# Patient Record
Sex: MALE | Race: WHITE | NOT HISPANIC OR LATINO | Employment: UNEMPLOYED | URBAN - METROPOLITAN AREA
[De-identification: names, ages, dates, MRNs, and addresses within clinical notes are randomized per-mention and may not be internally consistent; named-entity substitution may affect disease eponyms.]

---

## 2017-12-14 ENCOUNTER — GENERIC CONVERSION - ENCOUNTER (OUTPATIENT)
Dept: OTHER | Facility: OTHER | Age: 32
End: 2017-12-14

## 2017-12-18 ENCOUNTER — ANESTHESIA EVENT (OUTPATIENT)
Dept: PERIOP | Facility: HOSPITAL | Age: 32
End: 2017-12-18
Payer: OTHER GOVERNMENT

## 2017-12-18 RX ORDER — DRONABINOL 5 MG/1
5 CAPSULE ORAL 4 TIMES DAILY
COMMUNITY

## 2017-12-18 RX ORDER — MODAFINIL 200 MG/1
200 TABLET ORAL DAILY
COMMUNITY

## 2017-12-18 RX ORDER — GABAPENTIN 600 MG/1
300 TABLET ORAL 3 TIMES DAILY
COMMUNITY

## 2017-12-19 ENCOUNTER — GENERIC CONVERSION - ENCOUNTER (OUTPATIENT)
Dept: GASTROENTEROLOGY | Facility: CLINIC | Age: 32
End: 2017-12-19

## 2017-12-19 ENCOUNTER — ANESTHESIA (OUTPATIENT)
Dept: PERIOP | Facility: HOSPITAL | Age: 32
End: 2017-12-19
Payer: OTHER GOVERNMENT

## 2017-12-19 ENCOUNTER — HOSPITAL ENCOUNTER (OUTPATIENT)
Facility: HOSPITAL | Age: 32
Setting detail: OUTPATIENT SURGERY
Discharge: HOME | End: 2017-12-19
Attending: INTERNAL MEDICINE | Admitting: INTERNAL MEDICINE
Payer: OTHER GOVERNMENT

## 2017-12-19 VITALS
HEART RATE: 60 BPM | OXYGEN SATURATION: 100 % | WEIGHT: 213 LBS | HEIGHT: 76 IN | TEMPERATURE: 98 F | RESPIRATION RATE: 14 BRPM | DIASTOLIC BLOOD PRESSURE: 55 MMHG | BODY MASS INDEX: 25.94 KG/M2 | SYSTOLIC BLOOD PRESSURE: 95 MMHG

## 2017-12-19 DIAGNOSIS — R63.4 RECENT WEIGHT LOSS: ICD-10-CM

## 2017-12-19 DIAGNOSIS — R11.2 NAUSEA AND VOMITING: ICD-10-CM

## 2017-12-19 DIAGNOSIS — K21.9 CHRONIC GERD: ICD-10-CM

## 2017-12-19 DIAGNOSIS — R01.1 HEART MURMUR: ICD-10-CM

## 2017-12-19 PROCEDURE — 88342 IMHCHEM/IMCYTCHM 1ST ANTB: CPT | Performed by: INTERNAL MEDICINE

## 2017-12-19 PROCEDURE — 88341 IMHCHEM/IMCYTCHM EA ADD ANTB: CPT | Performed by: INTERNAL MEDICINE

## 2017-12-19 PROCEDURE — 88305 TISSUE EXAM BY PATHOLOGIST: CPT | Performed by: INTERNAL MEDICINE

## 2017-12-19 RX ORDER — SODIUM CHLORIDE, SODIUM LACTATE, POTASSIUM CHLORIDE, CALCIUM CHLORIDE 600; 310; 30; 20 MG/100ML; MG/100ML; MG/100ML; MG/100ML
125 INJECTION, SOLUTION INTRAVENOUS CONTINUOUS
Status: DISCONTINUED | OUTPATIENT
Start: 2017-12-19 | End: 2017-12-19 | Stop reason: HOSPADM

## 2017-12-19 RX ORDER — PROPOFOL 10 MG/ML
INJECTION, EMULSION INTRAVENOUS AS NEEDED
Status: DISCONTINUED | OUTPATIENT
Start: 2017-12-19 | End: 2017-12-19 | Stop reason: SURG

## 2017-12-19 RX ADMIN — SODIUM CHLORIDE, POTASSIUM CHLORIDE, SODIUM LACTATE AND CALCIUM CHLORIDE: 600; 310; 30; 20 INJECTION, SOLUTION INTRAVENOUS at 10:38

## 2017-12-19 RX ADMIN — PROPOFOL 50 MG: 10 INJECTION, EMULSION INTRAVENOUS at 11:07

## 2017-12-19 RX ADMIN — SODIUM CHLORIDE, POTASSIUM CHLORIDE, SODIUM LACTATE AND CALCIUM CHLORIDE 125 ML/HR: 600; 310; 30; 20 INJECTION, SOLUTION INTRAVENOUS at 09:45

## 2017-12-19 RX ADMIN — PROPOFOL 150 MG: 10 INJECTION, EMULSION INTRAVENOUS at 11:05

## 2017-12-19 NOTE — OP NOTE
**** GI/ENDOSCOPY REPORT ****     PATIENT NAME: Rosie De La Cruz  GUESS - VISIT ID:  Patient ID: [id number]   YOB: 1985     INTRODUCTION: Esophagogastroduodenoscopy - A 33 male patient presents for   an outpatient Esophagogastroduodenoscopy at [facility]  INDICATIONS: GERD  Nausea  Vomiting Loss of weight  CONSENT: The benefits, risks, and alternatives to the procedure were   discussed and informed consent was obtained from the patient  PREPARATION:  EKG, pulse, pulse oximetry and blood pressure were monitored   throughout the procedure  MEDICATIONS:asa 2     PROCEDURE:  The endoscope was passed without difficulty through the mouth   under direct visualization and advanced to the 3rd portion of the   duodenum  The scope was withdrawn and the mucosa was carefully examined  FINDINGS:   Esophagus: LA Class A reflux-induced esophagitis was found in   the GE junction, 40 cm from the entry site  The Z line was visualized at   40 cm from the entry site  Stomach: The antrum, body of the stomach,   cardia, fundus, incisura, and pylorus appeared to be normal  A biopsy was   taken from the antrum, body of the stomach, and fundus  Duodenum: The   duodenal bulb, 2nd portion of the duodenum, and 3rd portion of the   duodenum appeared to be normal      COMPLICATIONS: There were no complications  IMPRESSIONS: Esophagitis seen in the GE junction  Z line visualized  Normal antrum, body of the stomach, cardia, fundus, incisura, and pylorus  Biopsy taken  Normal duodenal bulb, 2nd portion of the duodenum, and 3rd   portion of the duodenum  RECOMMENDATIONS: Continue current medications  Follow-up on the results of   the biopsy specimens in 2 weeks  ESTIMATED BLOOD LOSS: Insignificant  PATHOLOGY SPECIMENS: Random biopsy taken from the antrum, body of the   stomach, and fundus       PROCEDURE CODES: 04663 - EGD flexible; with biopsy     ICD-9 Codes: 530 81 Esophageal reflux 787 01 Nausea with vomiting 783 21   Loss of weight 530 11 Reflux esophagitis     ICD-10 Codes: K21 Gastro-esophageal reflux disease R11 2 Nausea with   vomiting, unspecified R63 4 Abnormal weight loss K20 9 Esophagitis,   unspecified     PERFORMED BY: Dr Katie Castleman, M D  [facility]  on 12/19/2017  Version 1, electronically signed by DOMINIC Rowe , D O  on   12/19/2017 at 11:12

## 2017-12-19 NOTE — ANESTHESIA PREPROCEDURE EVALUATION
Review of Systems/Medical History  Patient summary reviewed        Cardiovascular  Negative cardio ROS    Pulmonary  Negative pulmonary ROS ,        GI/Hepatic    GERD ,        Negative  ROS        Endo/Other  Negative endo/other ROS      GYN  Negative gynecology ROS          Hematology  Negative hematology ROS      Musculoskeletal  Negative musculoskeletal ROS        Neurology  Seizures ,    Comment: ptsd   Psychology   Negative psychology ROS            Physical Exam    Airway    Mallampati score: II  TM Distance: >3 FB  Neck ROM: full     Dental       Cardiovascular  Comment: Negative ROS, Cardiovascular exam normal    Pulmonary  Pulmonary exam normal     Other Findings        Anesthesia Plan  ASA Score- 2       Anesthesia Type- IV sedation with anesthesia with ASA Monitors  Additional Monitors:   Airway Plan:           Induction- intravenous  Informed Consent- Anesthetic plan and risks discussed with patient  I personally reviewed this patient with the CRNA  Discussed and agreed on the Anesthesia Plan with the CRNA  Val Kendrick

## 2017-12-19 NOTE — ANESTHESIA POSTPROCEDURE EVALUATION
Post-Op Assessment Note      CV Status:  Stable    Mental Status:  Awake    Hydration Status:  Stable    PONV Controlled:  None    Airway Patency:  Patent and adequate    Post Op Vitals Reviewed: Yes          Staff: CRNA, Anesthesiologist           BP   121/74   Temp      Pulse  65   Resp   18   SpO2   100%

## 2017-12-19 NOTE — DISCHARGE INSTRUCTIONS
Upper Endoscopy   WHAT YOU NEED TO KNOW:   An upper endoscopy is also called an upper gastrointestinal (GI) endoscopy, or an esophagogastroduodenoscopy (EGD)  You may feel bloated, gassy, or have some abdominal discomfort after your procedure  Your throat may be sore for 24 to 36 hours  You may burp or pass gas from air that is still inside your body  DISCHARGE INSTRUCTIONS:   Call 911 if:   · You have sudden chest pain or trouble breathing  Seek care immediately if:   · You feel dizzy or faint  · You have trouble swallowing  · You have severe throat pain  · Your bowel movements are very dark or black  · Your abdomen is hard and firm and you have severe pain  · You vomit blood  Contact your healthcare provider if:   · You feel full or bloated and cannot burp or pass gas  · You have not had a bowel movement for 3 days after your procedure  · You have neck pain  · You have a fever or chills  · You have nausea or are vomiting  · You have a rash or hives  · You have questions or concerns about your endoscopy  Relieve a sore throat:  Suck on throat lozenges or crushed ice  Gargle with a small amount of warm salt water  Mix 1 teaspoon of salt and 1 cup of warm water to make salt water  Relieve gas and discomfort from bloating:  Lie on your right side with a heating pad on your abdomen  Take short walks to help pass gas  Eat small meals until bloating is relieved  Rest after your procedure:  Do not drive or make important decisions until the day after your procedure  Return to your normal activity as directed  You can usually return to work the day after your procedure  Follow up with your healthcare provider as directed:  Write down your questions so you remember to ask them during your visits  © 2017 [employer]  Information is for End User's use only and may not be sold, redistributed or otherwise used for commercial purposes   All illustrations and images included in [employer] are the copyrighted property of [employer]  or [address]  The above information is an  only  It is not intended as medical advice for individual conditions or treatments  Talk to your doctor, nurse or pharmacist before following any medical regimen to see if it is safe and effective for you

## 2018-01-02 ENCOUNTER — GENERIC CONVERSION - ENCOUNTER (OUTPATIENT)
Dept: OTHER | Facility: OTHER | Age: 33
End: 2018-01-02

## 2018-01-02 ENCOUNTER — ALLSCRIPTS OFFICE VISIT (OUTPATIENT)
Dept: OTHER | Facility: OTHER | Age: 33
End: 2018-01-02

## 2018-01-02 DIAGNOSIS — R01.1 CARDIAC MURMUR: ICD-10-CM

## 2018-01-24 ENCOUNTER — TELEPHONE (OUTPATIENT)
Dept: GASTROENTEROLOGY | Facility: CLINIC | Age: 33
End: 2018-01-24

## 2018-01-24 VITALS
HEIGHT: 76 IN | SYSTOLIC BLOOD PRESSURE: 112 MMHG | BODY MASS INDEX: 26.55 KG/M2 | OXYGEN SATURATION: 98 % | DIASTOLIC BLOOD PRESSURE: 70 MMHG | HEART RATE: 74 BPM | WEIGHT: 218 LBS

## 2018-01-24 VITALS
BODY MASS INDEX: 25.63 KG/M2 | HEIGHT: 76 IN | WEIGHT: 210.5 LBS | SYSTOLIC BLOOD PRESSURE: 124 MMHG | DIASTOLIC BLOOD PRESSURE: 80 MMHG | HEART RATE: 76 BPM

## 2018-01-29 ENCOUNTER — HOSPITAL ENCOUNTER (OUTPATIENT)
Dept: NON INVASIVE DIAGNOSTICS | Facility: CLINIC | Age: 33
Discharge: HOME | End: 2018-01-29
Payer: OTHER GOVERNMENT

## 2018-01-29 DIAGNOSIS — R01.1 CARDIAC MURMUR: ICD-10-CM

## 2018-01-29 PROCEDURE — 93306 TTE W/DOPPLER COMPLETE: CPT

## 2018-01-29 PROCEDURE — 93306 TTE W/DOPPLER COMPLETE: CPT | Performed by: INTERNAL MEDICINE

## 2021-02-08 ENCOUNTER — APPOINTMENT (OUTPATIENT)
Dept: URGENT CARE | Facility: CLINIC | Age: 36
End: 2021-02-08

## 2021-02-08 DIAGNOSIS — Z02.89 ENCOUNTER FOR PHYSICAL EXAMINATION RELATED TO EMPLOYMENT: Primary | ICD-10-CM

## 2021-02-08 PROCEDURE — 86735 MUMPS ANTIBODY: CPT | Performed by: PHYSICIAN ASSISTANT

## 2021-02-08 PROCEDURE — 86787 VARICELLA-ZOSTER ANTIBODY: CPT | Performed by: PHYSICIAN ASSISTANT

## 2021-02-08 PROCEDURE — 86480 TB TEST CELL IMMUN MEASURE: CPT | Performed by: PHYSICIAN ASSISTANT

## 2021-02-08 PROCEDURE — 86765 RUBEOLA ANTIBODY: CPT | Performed by: PHYSICIAN ASSISTANT

## 2021-02-08 PROCEDURE — 86762 RUBELLA ANTIBODY: CPT | Performed by: PHYSICIAN ASSISTANT

## 2021-02-09 LAB
MEV IGG SER QL: ABNORMAL
MUV IGG SER QL: NORMAL
RUBV IGG SERPL IA-ACNC: >175 IU/ML
VZV IGG SER IA-ACNC: NORMAL

## 2021-02-10 LAB
GAMMA INTERFERON BACKGROUND BLD IA-ACNC: 0.01 IU/ML
M TB IFN-G BLD-IMP: NEGATIVE
M TB IFN-G CD4+ BCKGRND COR BLD-ACNC: 0.01 IU/ML
M TB IFN-G CD4+ BCKGRND COR BLD-ACNC: 0.01 IU/ML
MITOGEN IGNF BCKGRD COR BLD-ACNC: >10 IU/ML

## 2023-11-30 ENCOUNTER — OFFICE VISIT (OUTPATIENT)
Dept: URGENT CARE | Facility: CLINIC | Age: 38
End: 2023-11-30
Payer: COMMERCIAL

## 2023-11-30 VITALS
HEART RATE: 78 BPM | RESPIRATION RATE: 20 BRPM | DIASTOLIC BLOOD PRESSURE: 68 MMHG | BODY MASS INDEX: 24.6 KG/M2 | WEIGHT: 202 LBS | TEMPERATURE: 98 F | SYSTOLIC BLOOD PRESSURE: 130 MMHG | HEIGHT: 76 IN | OXYGEN SATURATION: 100 %

## 2023-11-30 DIAGNOSIS — Z72.89 ENGAGES IN VAPING: ICD-10-CM

## 2023-11-30 DIAGNOSIS — J22 ACUTE RESPIRATORY INFECTION: Primary | ICD-10-CM

## 2023-11-30 PROCEDURE — 99214 OFFICE O/P EST MOD 30 MIN: CPT | Performed by: NURSE PRACTITIONER

## 2023-11-30 RX ORDER — AZITHROMYCIN 250 MG/1
TABLET, FILM COATED ORAL
Qty: 6 TABLET | Refills: 0 | Status: SHIPPED | OUTPATIENT
Start: 2023-11-30 | End: 2023-12-04

## 2023-11-30 RX ORDER — BENZONATATE 100 MG/1
100 CAPSULE ORAL 3 TIMES DAILY PRN
Qty: 20 CAPSULE | Refills: 0 | Status: SHIPPED | OUTPATIENT
Start: 2023-11-30

## 2023-11-30 NOTE — PROGRESS NOTES
[facility]        NAME: Kenneth Campbell is a 41 y.o. male  : 1985    MRN: [id number]  DATE: 2023  TIME: 10:38 AM    Assessment and Plan   Acute respiratory infection [J22]  1. Acute respiratory infection  azithromycin (ZITHROMAX) 250 mg tablet    benzonatate (TESSALON PERLES) 100 mg capsule      2. Engages in vaping  azithromycin (ZITHROMAX) 250 mg tablet    benzonatate (TESSALON PERLES) 100 mg capsule            Patient Instructions       Follow up with PCP in 3-5 days. Proceed to  ER if symptoms worsen. You have a respiratory infection. You are to take the azithromycin and tessalon pearls for cough. Drink plenty of water. Follow up with your provider in 3-5 days   Go to the ED if symptoms worsen  You may continue the OTC cough, cold congestion products if needed       Chief Complaint     Chief Complaint   Patient presents with    Cough     X one week , prod for lime green     Nasal Congestion     X one week , liome green color         History of Present Illness       This is a 41year old male who states has had cough, congestion, sorethroat x 1 week. Nasal congestion and productive cough with lime-green sputum per patient. He has had some diarrhea. He denies covid or flu vaccine and denies any testing. He states that he has been taking OTC w/o much relief. He missed work today. Denies fevers, n/v.           Review of Systems   Review of Systems   Constitutional:  Positive for chills. HENT:  Positive for congestion and sore throat. Eyes: Negative. Respiratory:  Positive for cough. Cardiovascular: Negative. Gastrointestinal:  Positive for diarrhea. Endocrine: Negative. Genitourinary: Negative. Musculoskeletal: Negative. Skin: Negative. Allergic/Immunologic: Negative. Neurological: Negative. Hematological: Negative. Psychiatric/Behavioral: Negative.            Current Medications       Current Outpatient Medications:     azithromycin (ZITHROMAX) 250 mg tablet, Take 2 tablets today then 1 tablet daily x 4 days, Disp: 6 tablet, Rfl: 0    benzonatate (TESSALON PERLES) 100 mg capsule, Take 1 capsule (100 mg total) by mouth 3 (three) times a day as needed for cough, Disp: 20 capsule, Rfl: 0    dronabinol (MARINOL) 5 MG capsule, Take 5 mg by mouth 4 (four) times a day (Patient not taking: Reported on 11/30/2023), Disp: , Rfl:     gabapentin (NEURONTIN) 600 MG tablet, Take 300 mg by mouth 3 (three) times a day (Patient not taking: Reported on 11/30/2023), Disp: , Rfl:     modafinil (PROVIGIL) 200 MG tablet, Take 200 mg by mouth daily (Patient not taking: Reported on 11/30/2023), Disp: , Rfl:     Current Allergies     Allergies as of 11/30/2023    (No Known Allergies)            The following portions of the patient's history were reviewed and updated as appropriate: allergies, current medications, past family history, past medical history, past social history, past surgical history and problem list.     Past Medical History:   Diagnosis Date    GERD (gastroesophageal reflux disease)     Heart murmur     PTSD (post-traumatic stress disorder)     Seizures ([address])     july 2016 r/t wellbutrin       Past Surgical History:   Procedure Laterality Date    HAND SURGERY      x 9    [place] ESOPHAGOGASTRODUODENOSCOPY TRANSORAL DIAGNOSTIC N/A 12/19/2017    Procedure: ESOPHAGOGASTRODUODENOSCOPY (EGD); Surgeon: Janes Ward MD;  Location: [facility]; Service: Gastroenterology       History reviewed. No pertinent family history. Medications have been verified. Objective   /68   Pulse 78   Temp 98 °F (36.7 °C)   Resp 20   Ht 6' 4" (1.93 m)   Wt 91.6 kg (202 lb)   SpO2 100%   BMI 24.59 kg/m²   No LMP for male patient. Physical Exam     Physical Exam  Vitals and nursing note reviewed. Constitutional:       General: He is not in acute distress. Appearance: Normal appearance. He is normal weight.  He is not ill-appearing, toxic-appearing or diaphoretic. HENT:      Head: Normocephalic and atraumatic. Right Ear: Tympanic membrane and ear canal normal.      Left Ear: Tympanic membrane and ear canal normal.      Nose: Congestion present. No rhinorrhea. Mouth/Throat:      Mouth: Mucous membranes are moist.      Pharynx: Oropharynx is clear. Posterior oropharyngeal erythema present. No oropharyngeal exudate. Comments: Injected   Eyes:      Extraocular Movements: Extraocular movements intact. Cardiovascular:      Rate and Rhythm: Normal rate and regular rhythm. Pulses: Normal pulses. Heart sounds: Normal heart sounds. Pulmonary:      Effort: Pulmonary effort is normal. No respiratory distress. Breath sounds: Normal breath sounds. No stridor. No wheezing, rhonchi or rales. Comments: Constant dry upper airway cough   Chest:      Chest wall: No tenderness. Musculoskeletal:         General: Normal range of motion. Cervical back: Normal range of motion and neck supple. Skin:     General: Skin is warm and dry. Capillary Refill: Capillary refill takes less than 2 seconds. Neurological:      General: No focal deficit present. Mental Status: He is alert and oriented to person, place, and time. Psychiatric:         Mood and Affect: Mood normal.         Behavior: Behavior normal.         Thought Content:  Thought content normal.         Judgment: Judgment normal.

## 2023-11-30 NOTE — LETTER
3November 30, 2023     Patient: Angela Phan   YOB: 1985   Date of Visit: 11/30/2023       To Whom It May Concern: It is my medical opinion that Angela Phan may return to work on 12/4/2023 . If you have any questions or concerns, please don't hesitate to call.          Sincerely,        TIN Love    CC: No Recipients

## 2023-11-30 NOTE — PATIENT INSTRUCTIONS
You have a respiratory infection. You are to take the azithromycin and tessalon pearls for cough. Drink plenty of water.   Follow up with your provider in 3-5 days   Go to the ED if symptoms worsen  You may continue the OTC cough, cold congestion products if needed

## 2024-09-30 ENCOUNTER — OFFICE VISIT (OUTPATIENT)
Dept: URGENT CARE | Facility: CLINIC | Age: 39
End: 2024-09-30
Payer: COMMERCIAL

## 2024-09-30 VITALS
SYSTOLIC BLOOD PRESSURE: 129 MMHG | TEMPERATURE: 98 F | HEART RATE: 60 BPM | RESPIRATION RATE: 18 BRPM | DIASTOLIC BLOOD PRESSURE: 76 MMHG | OXYGEN SATURATION: 97 %

## 2024-09-30 DIAGNOSIS — L72.9 CYST OF SKIN: Primary | ICD-10-CM

## 2024-09-30 PROCEDURE — 10060 I&D ABSCESS SIMPLE/SINGLE: CPT | Performed by: STUDENT IN AN ORGANIZED HEALTH CARE EDUCATION/TRAINING PROGRAM

## 2024-09-30 PROCEDURE — 99214 OFFICE O/P EST MOD 30 MIN: CPT | Performed by: STUDENT IN AN ORGANIZED HEALTH CARE EDUCATION/TRAINING PROGRAM

## 2024-09-30 NOTE — PROGRESS NOTES
[facility]        NAME: Rosa Duncan is a 39 y.o. male  : 1985    MRN: [id number]  DATE: 2024  TIME: 4:00 PM    Assessment and Orders   Cyst of skin [L72.9]  1. Cyst of skin  Incision and drain            Plan and Discussion      Symptoms and exam consistent with cyst. Easily removed with I&D. Does not require antibiotics. Discussed wound care.      Risks and benefits discussed. Patient understands and agrees with the plan.     PATIENT INSTRUCTIONS    If tests have been performed at [facility], our office will contact you with results if changes need to be made to the care plan discussed with you at the visit.  [facility]    Follow up with PCP.     If any of the following occur, please report to your nearest ED for evaluation or call 911.   Difficultly breathing or shortness of breath  Chest pain  Acutely worsening symptoms.         Chief Complaint     Chief Complaint   Patient presents with    Mass     Left elbow           History of Present Illness       Cyst developed on left forearm over the weekend. No injury to the area. No pain in the elbow.     Rash  This is a new problem. The current episode started in the past 7 days. The problem has been gradually worsening since onset. The affected locations include the left arm. The rash is characterized by swelling. Pertinent negatives include no fever or itching.       Review of Systems   Review of Systems   Constitutional:  Negative for fever.   Skin:  Positive for rash. Negative for itching.         Current Medications       Current Outpatient Medications:     benzonatate (TESSALON PERLES) 100 mg capsule, Take 1 capsule (100 mg total) by mouth 3 (three) times a day as needed for cough (Patient not taking: Reported on 2024), Disp: 20 capsule, Rfl: 0    dronabinol (MARINOL) 5 MG capsule, Take 5 mg by mouth 4 (four) times a day (Patient not taking: Reported on 2023), Disp: , Rfl:      gabapentin (NEURONTIN) 600 MG tablet, Take 300 mg by mouth 3 (three) times a day (Patient not taking: Reported on 11/30/2023), Disp: , Rfl:     modafinil (PROVIGIL) 200 MG tablet, Take 200 mg by mouth daily (Patient not taking: Reported on 11/30/2023), Disp: , Rfl:     Current Allergies     Allergies as of 09/30/2024    (No Known Allergies)            The following portions of the patient's history were reviewed and updated as appropriate: allergies, current medications, past family history, past medical history, past social history, past surgical history and problem list.     Past Medical History:   Diagnosis Date    GERD (gastroesophageal reflux disease)     Heart murmur     PTSD (post-traumatic stress disorder)     Seizures (HCC)     july 2016 r/t wellbutrin       Past Surgical History:   Procedure Laterality Date    HAND SURGERY      x 9    [place] ESOPHAGOGASTRODUODENOSCOPY TRANSORAL DIAGNOSTIC N/A 12/19/2017    Procedure: ESOPHAGOGASTRODUODENOSCOPY (EGD);  Surgeon: Bony Nava MD;  Location: [facility];  Service: Gastroenterology       History reviewed. No pertinent family history.      Medications have been verified.        Objective   /76   Pulse 60   Temp 98 °F (36.7 °C)   Resp 18   SpO2 97%   No LMP for male patient.       Physical Exam     Physical Exam  Constitutional:       General: He is not in acute distress.  Pulmonary:      Effort: Pulmonary effort is normal. No respiratory distress.   Skin:     Findings: Lesion present. No erythema or rash.          Neurological:      General: No focal deficit present.      Mental Status: He is alert and oriented to person, place, and time.   Psychiatric:         Mood and Affect: Mood normal.         Behavior: Behavior normal.               Nelly Sanders DO     Incision and drain    Date/Time: 9/30/2024 2:30 PM    Performed by: Nelly Sanders DO  Authorized by: Nelly Sanders DO  Universal Protocol:  procedure performed by consultantConsent:  Verbal consent obtained.  Consent given by: patient  Patient understanding: patient states understanding of the procedure being performed  Patient consent: the patient's understanding of the procedure matches consent given  Patient identity confirmed: verbally with patient    Patient location:  Clinic  Location:     Type:  Cyst    Size:  2 cm    Location:  Upper extremity    Upper extremity location:  L arm  Pre-procedure details:     Skin preparation:  Betadine  Anesthesia (see MAR for exact dosages):     Anesthesia method:  Local infiltration    Local anesthetic:  Lidocaine 1% w/o epi  Procedure details:     Complexity:  Simple    Needle aspiration: no      Incision types:  Stab incision    Scalpel blade:  11    Approach:  Open    Incision depth:  Subcutaneous    Wound management:  Probed and deloculated    Drainage:  Purulent    Drainage amount:  Moderate    Wound treatment:  Wound left open  Post-procedure details:     Patient tolerance of procedure:  Tolerated well, no immediate complications

## 2025-05-09 ENCOUNTER — OFFICE VISIT (OUTPATIENT)
Dept: URGENT CARE | Facility: CLINIC | Age: 40
End: 2025-05-09
Payer: COMMERCIAL

## 2025-05-09 VITALS
TEMPERATURE: 98 F | DIASTOLIC BLOOD PRESSURE: 70 MMHG | HEIGHT: 76 IN | SYSTOLIC BLOOD PRESSURE: 116 MMHG | RESPIRATION RATE: 18 BRPM | BODY MASS INDEX: 24.96 KG/M2 | HEART RATE: 78 BPM | WEIGHT: 205 LBS | OXYGEN SATURATION: 99 %

## 2025-05-09 DIAGNOSIS — H57.89 IRRITATION OF EYE: Primary | ICD-10-CM

## 2025-05-09 PROCEDURE — 99214 OFFICE O/P EST MOD 30 MIN: CPT | Performed by: PHYSICAL MEDICINE & REHABILITATION

## 2025-05-09 PROCEDURE — 65205 REMOVE FOREIGN BODY FROM EYE: CPT | Performed by: PHYSICAL MEDICINE & REHABILITATION

## 2025-05-09 RX ORDER — TETRACAINE HYDROCHLORIDE 5 MG/ML
2 SOLUTION OPHTHALMIC ONCE
Status: COMPLETED | OUTPATIENT
Start: 2025-05-09 | End: 2025-05-09

## 2025-05-09 RX ORDER — POLYMYXIN B SULFATE AND TRIMETHOPRIM 1; 10000 MG/ML; [USP'U]/ML
1 SOLUTION OPHTHALMIC EVERY 6 HOURS
Qty: 10 ML | Refills: 0 | Status: SHIPPED | OUTPATIENT
Start: 2025-05-09

## 2025-05-09 RX ADMIN — TETRACAINE HYDROCHLORIDE 2 DROP: 5 SOLUTION OPHTHALMIC at 13:00

## 2025-05-09 NOTE — PROGRESS NOTES
[facility]        NAME: Rosa Duncan is a 40 y.o. male  : 1985    MRN: [id number]  DATE: May 9, 2025  TIME: 1:28 PM    Assessment and Plan   Irritation of eye [H57.89]  1. Irritation of eye  polymyxin b-trimethoprim (POLYTRIM) ophthalmic solution    Foreign body removal    tetracaine 0.5 % ophthalmic solution 2 drop    fluorescein sodium sterile 1 mg ophthalmic strip 1 strip        Unclear etiology of eye irritation   Eye is injected to conjunctivae. Eyelids and eye visualized under tetracaine and fluorescein   No corneal abrasion noted, no foreign body noted  Did flush eye with normal saline  Will trial antibiotic eye drops and recommend close Ophthalmology follow up in 2-3 days.    Patient Instructions       Follow up with PCP in 3-5 days.  Proceed to  ER if symptoms worsen.    If tests are performed, our office will contact you with results only if changes need to made to the care plan discussed with you at the visit. [facility]    Chief Complaint     Chief Complaint   Patient presents with    Eye Problem     Pain and redness in left eye upon awakening, was grinding metal yesterday         History of Present Illness       Pt is a 40 year old male presenting with left eye pain, redness that started this morning 25. He does note he was grinding metal yesterday. He was wearing his contacts and protective eye equipment. He did not notice discomfort yesterday on 25 but this morning 25 upon waking up. He did try to flush his eye at work. He denies blurriness outside of his normal blurred vision.     Eye Problem   Associated symptoms include an eye discharge and eye redness.       Review of Systems   Review of Systems   Constitutional: Negative.    Eyes:  Positive for pain, discharge and redness. Negative for visual disturbance.        Watery discharge   Respiratory: Negative.     Cardiovascular: Negative.          Current Medications        Current Outpatient Medications:     polymyxin b-trimethoprim (POLYTRIM) ophthalmic solution, Administer 1 drop into the left eye every 6 (six) hours, Disp: 10 mL, Rfl: 0    benzonatate (TESSALON PERLES) 100 mg capsule, Take 1 capsule (100 mg total) by mouth 3 (three) times a day as needed for cough (Patient not taking: Reported on 5/9/2025), Disp: 20 capsule, Rfl: 0    dronabinol (MARINOL) 5 MG capsule, Take 5 mg by mouth 4 (four) times a day (Patient not taking: Reported on 5/9/2025), Disp: , Rfl:     gabapentin (NEURONTIN) 600 MG tablet, Take 300 mg by mouth 3 (three) times a day (Patient not taking: Reported on 5/9/2025), Disp: , Rfl:     modafinil (PROVIGIL) 200 MG tablet, Take 200 mg by mouth daily (Patient not taking: Reported on 5/9/2025), Disp: , Rfl:     Current Facility-Administered Medications:     fluorescein sodium sterile 1 mg ophthalmic strip 1 strip, 1 strip, Left Eye, Once,     tetracaine 0.5 % ophthalmic solution 2 drop, 2 drop, Left Eye, Once,     Current Allergies     Allergies as of 05/09/2025    (No Known Allergies)            The following portions of the patient's history were reviewed and updated as appropriate: allergies, current medications, past family history, past medical history, past social history, past surgical history and problem list.     Past Medical History:   Diagnosis Date    GERD (gastroesophageal reflux disease)     Heart murmur     PTSD (post-traumatic stress disorder)     Seizures (HCC)     july 2016 r/t wellbutrin       Past Surgical History:   Procedure Laterality Date    HAND SURGERY      x 9    [place] ESOPHAGOGASTRODUODENOSCOPY TRANSORAL DIAGNOSTIC N/A 12/19/2017    Procedure: ESOPHAGOGASTRODUODENOSCOPY (EGD);  Surgeon: Bony Nava MD;  Location: [facility];  Service: Gastroenterology       History reviewed. No pertinent family history.      Medications have been verified.        Objective   /70   Pulse 78   Temp 98 °F (36.7 °C) (Temporal)   Resp 18    "Ht 6' 4\" (1.93 m)   Wt 93 kg (205 lb)   SpO2 99%   BMI 24.95 kg/m²        Physical Exam     Physical Exam  Vitals reviewed.   Eyes:      General: Visual field deficit present.         Right eye: No foreign body.         Left eye: No foreign body or discharge.      Extraocular Movements:      Left eye: Normal extraocular motion.      Conjunctiva/sclera:      Left eye: Left conjunctiva is injected.      Comments: 20/70 left eye, 20/20 right- however patient is wearing contact in right eye no contact in left eye   Cardiovascular:      Rate and Rhythm: Normal rate and regular rhythm.      Pulses: Normal pulses.      Heart sounds: Normal heart sounds.   Pulmonary:      Effort: Pulmonary effort is normal.      Breath sounds: Normal breath sounds.             Universal Protocol:  procedure performed by consultantConsent: Verbal consent obtained.  Risks and benefits: risks, benefits and alternatives were discussed  Consent given by: patient  Patient understanding: patient states understanding of the procedure being performed  Patient consent: the patient's understanding of the procedure matches consent given  Patient identity confirmed: verbally with patient  Foreign body removal    Date/Time: 5/9/2025 1:00 PM    Performed by: Jenelle Elias PA-C  Authorized by: Jenelle Elias PA-C  Body area: eye  Location details: left conjunctiva    Anesthesia:  Local Anesthetic: tetracaine drops  Anesthetic total (drops): 2Localization method: visualized and eyelid eversion  Removal mechanism: moist cotton swab  Eye examined with fluorescein.  No fluorescein uptake.  0 objects recovered.  Objects recovered: 0  Post-procedure assessment: foreign body not removed  Patient tolerance: patient tolerated the procedure well with no immediate complications  Comments: No foreign body or corneal abrasion identified               "

## 2025-05-09 NOTE — LETTER
May 9, 2025     Patient: Rosa Duncan   YOB: 1985   Date of Visit: 5/9/2025       To Whom it May Concern:    Rosa Duncan was seen in my clinic on 5/9/2025. He may return to work on 5/12/25 .    If you have any questions or concerns, please don't hesitate to call.         Sincerely,          [facility]        CC: No Recipients

## 2025-06-09 ENCOUNTER — OFFICE VISIT (OUTPATIENT)
Dept: URGENT CARE | Facility: CLINIC | Age: 40
End: 2025-06-09
Payer: COMMERCIAL

## 2025-06-09 VITALS
OXYGEN SATURATION: 99 % | TEMPERATURE: 97.8 F | WEIGHT: 202 LBS | DIASTOLIC BLOOD PRESSURE: 78 MMHG | RESPIRATION RATE: 20 BRPM | BODY MASS INDEX: 24.59 KG/M2 | SYSTOLIC BLOOD PRESSURE: 128 MMHG | HEART RATE: 64 BPM

## 2025-06-09 DIAGNOSIS — S39.012A STRAIN OF MUSCLE, FASCIA AND TENDON OF LOWER BACK, INITIAL ENCOUNTER: Primary | ICD-10-CM

## 2025-06-09 DIAGNOSIS — N64.4 NIPPLE PAIN: ICD-10-CM

## 2025-06-09 PROCEDURE — 99213 OFFICE O/P EST LOW 20 MIN: CPT | Performed by: PHYSICIAN ASSISTANT

## 2025-06-09 RX ORDER — METHOCARBAMOL 500 MG/1
500 TABLET, FILM COATED ORAL 4 TIMES DAILY PRN
Qty: 15 TABLET | Refills: 0 | Status: SHIPPED | OUTPATIENT
Start: 2025-06-09

## 2025-06-09 RX ORDER — MELOXICAM 15 MG/1
15 TABLET ORAL DAILY
Qty: 10 TABLET | Refills: 0 | Status: SHIPPED | OUTPATIENT
Start: 2025-06-09

## 2025-06-09 NOTE — PROGRESS NOTES
Mobic  [facility]  Name: Rosa Duncan      : 1985      MRN: [id number]  Encounter Provider: Virgen Franklin PA-C  Encounter Date: 2025   Encounter department: [facility]  :  Assessment & Plan  Strain of muscle, fascia and tendon of lower back, initial encounter    Orders:    meloxicam (Mobic) 15 mg tablet; Take 1 tablet (15 mg total) by mouth daily    methocarbamol (ROBAXIN) 500 mg tablet; Take 1 tablet (500 mg total) by mouth 4 (four) times a day as needed for muscle spasms    Nipple pain           Assessment & Plan        Patient Instructions  Take Mobic and Robaxin as prescribed  Do not take Mobic with other NSAIDs  Do not drive or operate heavy machinery while taking Robaxin  Rest (for no longer than 24 hours)  Stretching exercises  Alternate ice and heat  Monitor for color change or increase in swelling of nipple. If this occurs, please seek medical attention.  Consider physical therapy if no improvement after 1 week  Follow up with PCP in 3-5 days.  Proceed to  ER if symptoms worsen.    If tests are performed, our office will contact you with results only if changes need to made to the care plan discussed with you at the visit. [facility]    Chief Complaint:   Chief Complaint   Patient presents with    Back Pain     History of Present Illness   History of Present Illness      Admits to R nipple TTP without erythema, swelling or drainage x Friday. Suspect possible insect bite. Non-pruritic. No mass.    Back Pain  This is a new problem. Episode onset: . The pain is present in the lumbar spine (L). Quality: tight. The pain is moderate. Exacerbated by: movement. Pertinent negatives include no bladder incontinence, bowel incontinence, fever, numbness, tingling or weakness. Treatments tried: icy/hot.         Review of Systems   Constitutional:  Negative for chills and fever.   Respiratory:  Negative for cough and  shortness of breath.    Gastrointestinal:  Negative for bowel incontinence, nausea and vomiting.   Genitourinary:  Negative for bladder incontinence.   Musculoskeletal:  Positive for back pain.   Skin:  Negative for color change.   Neurological:  Negative for tingling, weakness and numbness.     Past Medical History   Past Medical History[1]  Past Surgical History[2]  Family History[3]  he reports that he has quit smoking. His smoking use included cigarettes. He has quit using smokeless tobacco. He reports current alcohol use of about 3.0 standard drinks of alcohol per week. He reports that he does not use drugs.  Current Outpatient Medications   Medication Instructions    benzonatate (TESSALON PERLES) 100 mg, Oral, 3 times daily PRN    dronabinol (MARINOL) 5 mg, 4 times daily    gabapentin (NEURONTIN) 300 mg, 3 times daily    meloxicam (MOBIC) 15 mg, Oral, Daily    methocarbamol (ROBAXIN) 500 mg, Oral, 4 times daily PRN    modafinil (PROVIGIL) 200 mg, Daily    polymyxin b-trimethoprim (POLYTRIM) ophthalmic solution 1 drop, Left Eye, Every 6 hours   Allergies[4]     Objective   /78   Pulse 64   Temp 97.8 °F (36.6 °C)   Resp 20   Wt 91.6 kg (202 lb)   SpO2 99%   BMI 24.59 kg/m²      Physical Exam  Vitals reviewed.   Constitutional:       General: He is not in acute distress.     Appearance: He is well-developed.   HENT:      Head: Normocephalic and atraumatic.     Cardiovascular:      Rate and Rhythm: Normal rate and regular rhythm.      Heart sounds: Normal heart sounds. No murmur heard.     No friction rub. No gallop.   Pulmonary:      Effort: Pulmonary effort is normal. No respiratory distress.      Breath sounds: Normal breath sounds. No wheezing, rhonchi or rales.   Chest:       Musculoskeletal:         General: No tenderness or deformity.      Comments: Unable to perform lumbar ROM secondary to pain.     Skin:     General: Skin is warm.      Findings: No rash.     Neurological:      Mental Status:  "He is alert and oriented to person, place, and time.      Sensory: No sensory deficit.      Coordination: Coordination normal.      Deep Tendon Reflexes: Reflexes are normal and symmetric. Reflexes normal.     Psychiatric:         Behavior: Behavior normal.         Thought Content: Thought content normal.         Judgment: Judgment normal.       Physical Exam      Results    Portions of the record may have been created with voice recognition software.  Occasional wrong word or \"sound a like\" substitutions may have occurred due to the inherent limitations of voice recognition software.  Read the chart carefully and recognize, using context, where substitutions have occurred.         [1]   Past Medical History:  Diagnosis Date    GERD (gastroesophageal reflux disease)     Heart murmur     PTSD (post-traumatic stress disorder)     Seizures (HCC)     july 2016 r/t wellbutrin   [2]   Past Surgical History:  Procedure Laterality Date    HAND SURGERY      x 9    [facility] ESOPHAGOGASTRODUODENOSCOPY TRANSORAL DIAGNOSTIC N/A 12/19/2017    Procedure: ESOPHAGOGASTRODUODENOSCOPY (EGD);  Surgeon: Bony Nava MD;  Location: [facility];  Service: Gastroenterology   [3] No family history on file.  [4] No Known Allergies    "

## 2025-06-09 NOTE — PATIENT INSTRUCTIONS
Take Mobic and Robaxin as prescribed  Do not take Mobic with other NSAIDs  Do not drive or operate heavy machinery while taking Robaxin  Rest (for no longer than 24 hours)  Stretching exercises  Alternate ice and heat  Monitor for color change or increase in swelling of nipple. If this occurs, please seek medical attention.  Consider physical therapy if no improvement after 1 week  Follow up with PCP in 3-5 days.  Proceed to  ER if symptoms worsen.    If tests are performed, our office will contact you with results only if changes need to made to the care plan discussed with you at the visit. You can review your full results on [facility] MyChart.

## 2025-06-09 NOTE — LETTER
June 9, 2025     Patient: Rosa Duncan   YOB: 1985   Date of Visit: 6/9/2025       To Whom It May Concern:    Please excuse Rosa Duncan from work on 6/9/2025.    If you have any questions or concerns, please don't hesitate to call.         Sincerely,        Virgen Franklin PA-C    CC: No Recipients

## 2025-08-19 ENCOUNTER — OFFICE VISIT (OUTPATIENT)
Dept: URGENT CARE | Facility: CLINIC | Age: 40
End: 2025-08-19
Payer: COMMERCIAL

## 2025-08-19 DIAGNOSIS — L03.031 CELLULITIS OF GREAT TOE, RIGHT: Primary | ICD-10-CM

## 2025-08-19 DIAGNOSIS — L03.011 PARONYCHIA OF RIGHT INDEX FINGER: ICD-10-CM

## 2025-08-19 PROCEDURE — 99214 OFFICE O/P EST MOD 30 MIN: CPT | Performed by: NURSE PRACTITIONER

## 2025-08-19 RX ORDER — DOXYCYCLINE 100 MG/1
100 CAPSULE ORAL 2 TIMES DAILY
Qty: 20 CAPSULE | Refills: 0 | Status: SHIPPED | OUTPATIENT
Start: 2025-08-19 | End: 2025-08-22 | Stop reason: SDUPTHER

## 2025-08-22 ENCOUNTER — TELEPHONE (OUTPATIENT)
Dept: URGENT CARE | Facility: CLINIC | Age: 40
End: 2025-08-22

## 2025-08-22 DIAGNOSIS — L03.011 PARONYCHIA OF RIGHT INDEX FINGER: ICD-10-CM

## 2025-08-22 DIAGNOSIS — L03.031 CELLULITIS OF GREAT TOE, RIGHT: ICD-10-CM

## 2025-08-22 RX ORDER — DOXYCYCLINE 100 MG/1
100 CAPSULE ORAL 2 TIMES DAILY
Qty: 20 CAPSULE | Refills: 0 | Status: SHIPPED | OUTPATIENT
Start: 2025-08-22 | End: 2025-09-01